# Patient Record
Sex: FEMALE | Race: WHITE | NOT HISPANIC OR LATINO | URBAN - METROPOLITAN AREA
[De-identification: names, ages, dates, MRNs, and addresses within clinical notes are randomized per-mention and may not be internally consistent; named-entity substitution may affect disease eponyms.]

---

## 2020-10-11 ENCOUNTER — EMERGENCY (EMERGENCY)
Facility: HOSPITAL | Age: 30
LOS: 1 days | Discharge: HOME | End: 2020-10-11
Attending: EMERGENCY MEDICINE | Admitting: EMERGENCY MEDICINE
Payer: COMMERCIAL

## 2020-10-11 VITALS
HEART RATE: 88 BPM | HEIGHT: 66 IN | TEMPERATURE: 98 F | SYSTOLIC BLOOD PRESSURE: 107 MMHG | WEIGHT: 139.99 LBS | RESPIRATION RATE: 18 BRPM | DIASTOLIC BLOOD PRESSURE: 78 MMHG | OXYGEN SATURATION: 98 %
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
## 2020-10-11 DIAGNOSIS — T74.21XA ADULT SEXUAL ABUSE, CONFIRMED, INITIAL ENCOUNTER: ICD-10-CM
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
## 2020-10-11 PROCEDURE — 73110 X-RAY EXAM OF WRIST: CPT | Mod: 26,LT

## 2020-10-11 PROCEDURE — 99283 EMERGENCY DEPT VISIT LOW MDM: CPT | Mod: 25

## 2020-10-11 PROCEDURE — 73130 X-RAY EXAM OF HAND: CPT | Mod: 26,LT

## 2020-10-11 RX ORDER — CLONAZEPAM 1 MG
0.5 TABLET ORAL ONCE
Refills: 0 | Status: DISCONTINUED | OUTPATIENT
Start: 2020-10-11 | End: 2020-10-11

## 2020-10-11 RX ORDER — CLONAZEPAM 1 MG
1 TABLET ORAL ONCE
Refills: 0 | Status: COMPLETED | OUTPATIENT
Start: 2020-10-11 | End: 2020-10-11

## 2020-10-11 RX ADMIN — Medication 0.5 MILLIGRAM(S): at 16:01

## 2020-10-11 NOTE — ED PROVIDER NOTE - DIAGNOSTIC INTERPRETATION
Interpreted by ED physician  left hand x-ray, 3 views  No fracture, no dislocation (joint spaces grossly normal), soft tissue normal     left wrist x-ray, 3 views  No fracture, no dislocation (joint spaces grossly normal), soft tissue normal

## 2020-10-11 NOTE — ED PROVIDER NOTE - PATIENT PORTAL LINK FT
You can access the FollowMyHealth Patient Portal offered by [facility] by registering at the following website: [email or web address]. By joining [facility]’s FollowMyHealth portal, you will also be able to view your health information using other applications (apps) compatible with our system.

## 2020-10-11 NOTE — ED PROVIDER NOTE - CARE PROVIDER_API CALL
Sandeep Shields  PLASTIC SURGERY  [address] [place] [address], [address]  [place] [address]  Phone: [phone]  Fax: [phone]  Follow Up Time:

## 2020-10-11 NOTE — ED PROVIDER NOTE - DISPOSITION TYPE
I received a message from Michoacano Rosales, stating patient had to cancel her surgery . I called and spoke with Lyle Ansari in Ambulatory Surgery and gave her this information. Patient will be rescheduled in a couple weeks.
DISCHARGE
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
## 2020-10-11 NOTE — ED PROVIDER NOTE - NSFOLLOWUPINSTRUCTIONS_ED_ALL_ED_FT
Please follow up with your primary care doctor and the hand/plastic surgery doctor Dr Shields this week.  Please call to make an appointment.  You may take ibuprofen 600mg every 6 hours as needed for pain.  Use the sling and splint as tolerated.  Ice for 5 minutes (not directly on skin, place a towel between skin and ice) every 30 minutes may help with pain.    Return at any time if you have any concerns.  If you have any trouble making an appointment please call Patient Access Services and they can help.      Your xray will be read by the radiologist in 24 hours.  If they see anything that we did not see today you will receive a call and the hand doctor can review it.